# Patient Record
Sex: FEMALE | Race: WHITE | URBAN - METROPOLITAN AREA
[De-identification: names, ages, dates, MRNs, and addresses within clinical notes are randomized per-mention and may not be internally consistent; named-entity substitution may affect disease eponyms.]

---

## 2019-05-18 ENCOUNTER — OFFICE VISIT (OUTPATIENT)
Dept: URGENT CARE | Facility: URGENT CARE | Age: 69
End: 2019-05-18
Payer: MEDICARE

## 2019-05-18 VITALS
SYSTOLIC BLOOD PRESSURE: 137 MMHG | WEIGHT: 159 LBS | OXYGEN SATURATION: 99 % | HEART RATE: 101 BPM | TEMPERATURE: 97.9 F | DIASTOLIC BLOOD PRESSURE: 87 MMHG

## 2019-05-18 DIAGNOSIS — R30.0 DYSURIA: ICD-10-CM

## 2019-05-18 DIAGNOSIS — R82.90 NONSPECIFIC FINDING ON EXAMINATION OF URINE: ICD-10-CM

## 2019-05-18 DIAGNOSIS — L02.92 BOIL: ICD-10-CM

## 2019-05-18 DIAGNOSIS — N30.01 ACUTE CYSTITIS WITH HEMATURIA: Primary | ICD-10-CM

## 2019-05-18 LAB
ALBUMIN UR-MCNC: 100 MG/DL
APPEARANCE UR: CLEAR
BACTERIA #/AREA URNS HPF: ABNORMAL /HPF
BILIRUB UR QL STRIP: NEGATIVE
COLOR UR AUTO: YELLOW
GLUCOSE UR STRIP-MCNC: NEGATIVE MG/DL
HGB UR QL STRIP: ABNORMAL
KETONES UR STRIP-MCNC: NEGATIVE MG/DL
LEUKOCYTE ESTERASE UR QL STRIP: ABNORMAL
NITRATE UR QL: NEGATIVE
NON-SQ EPI CELLS #/AREA URNS LPF: ABNORMAL /LPF
PH UR STRIP: 7 PH (ref 5–7)
RBC #/AREA URNS AUTO: ABNORMAL /HPF
SOURCE: ABNORMAL
SP GR UR STRIP: 1.01 (ref 1–1.03)
UROBILINOGEN UR STRIP-ACNC: 0.2 EU/DL (ref 0.2–1)
WBC #/AREA URNS AUTO: ABNORMAL /HPF

## 2019-05-18 PROCEDURE — 81001 URINALYSIS AUTO W/SCOPE: CPT | Performed by: INTERNAL MEDICINE

## 2019-05-18 PROCEDURE — 87086 URINE CULTURE/COLONY COUNT: CPT | Performed by: PHYSICIAN ASSISTANT

## 2019-05-18 PROCEDURE — 99203 OFFICE O/P NEW LOW 30 MIN: CPT | Performed by: PHYSICIAN ASSISTANT

## 2019-05-18 RX ORDER — SULFAMETHOXAZOLE/TRIMETHOPRIM 800-160 MG
1 TABLET ORAL 2 TIMES DAILY
Qty: 14 TABLET | Refills: 0 | Status: SHIPPED | OUTPATIENT
Start: 2019-05-18 | End: 2019-05-25

## 2019-05-18 NOTE — PATIENT INSTRUCTIONS
1. UTI  Your urine test shows evidence of a urinary tract infection.    We will treat you with an antibiotic, Bactrim.  I sent this to your pharmacy. Please take as directed twice daily for 7 days. Please take a probiotic while you are on the antibiotic.    If developing high fevers, vomiting, abdominal pain, or any other new, concerning symptoms, come back immediately. If no improvement in symptoms by the end of your antibiotic treatment, follow up with your primary care doctor.    Otherwise, simply follow up as needed.        Urinary Tract Infections in Women  Urinary tract infections (UTIs) are most often caused by bacteria (germs). These bacteria enter the urinary tract. The bacteria may come from outside the body. Or they may travel from the skin outside the rectum or vagina into the urethra. Female anatomy makes it easier for bacteria from the bowel to enter a woman s urinary tract, which is the most common source of UTI. This means women develop UTIs more often than men. Pain in or around the urinary tract is a common UTI symptom. But the only way to know for sure if you have a UTI for the health care provider to test your urine. The two tests that may be done are the urinalysis and urine culture.  Types of UTIs    Cystitis: A bladder infection (cystitis) is the most common UTI in women. You may have urgent or frequent urination. You may also have pain, burning when you urinate, and bloody urine.    Urethritis: This is an inflamed urethra, which is the tube that carries urine from the bladder to outside the body. You may have lower stomach or back pain. You may also have urgent or frequent urination.    Pyelonephritis: This is a kidney infection. If not treated, it can be serious and damage your kidneys. In severe cases, you may be hospitalized. You may have a fever and lower back pain.  Medications to treat a UTI  Most UTIs are treated with antibiotics. These kill the bacteria. The length of time you need to  take them depends on the type of infection. It may be as short as 3 days. If you have repeated UTIs, a low-dose antibiotic may be needed for several months. Take antibiotics exactly as directed. Don t stop taking them until all of the medication is gone. If you stop taking the antibiotic too soon, the infection may not go away, and you may develop a resistance to the antibiotic. This can make it much harder to treat.  Lifestyle changes to treat and prevent UTIs  The lifestyle changes below will help get rid of your UTI. They may also help prevent future UTIs.    Drink plenty of fluids. This includes water, juice, or other caffeine-free drinks. Fluids help flush bacteria out of your body.    Empty your bladder. Always empty your bladder when you feel the urge to urinate. And always urinate before going to sleep. Urine that stays in your bladder can lead to infection. Try to urinate before and after sex as well.    Practice good personal hygiene. Wipe yourself from front to back after using the toilet. This helps keep bacteria from getting into the urethra.    Use condoms during sex. These help prevent UTIs caused by sexually transmitted bacteria. Also, avoid using spermicides during sex. These can increase the risk of UTIs. Choose other forms of birth control instead. For women who tend to get UTIs after sex, a low-dose of a preventive antibiotic may be used. Be sure to discuss this option with your health care provider.    Follow up with your health care provider as directed. He or she may test to make sure the infection has cleared. If necessary, additional treatment may be started.    8028-8320 [employer]. [address] [place] [address], [place] [address]. All rights reserved. This information is not intended as a substitute for professional medical care. Always follow your healthcare professional's instructions.      2. Boil on bottom  There are no signs of an abscess needing to be drained today.  This  does appear like a skin infection.  Please take Bactrim twice daily as above for treatment.  Follow up with your doctor for a recheck on Monday.  If spreading redness, fever, severe swelling, then be seen immediately.

## 2019-05-18 NOTE — PROGRESS NOTES
"SUBJECTIVE:   Nani Ladd is a 68 year old female presenting with a chief complaint of   Chief Complaint   Patient presents with     Urgent Care     UTI     c/o dysuria and sore on back     1.   UTI    Onset of symptoms was 2day(s).  Course of illness is worsening  Severity moderate  Current and associated symptoms dysuria  Urinary frequency, urgency  Noticed blood in urine today. Denies passing blood clots.  No nausea, vomiting, abdominal pain. Has bladder fullness sensation. No fever. No flank pain.  Treatment and measures tried Increase fluid intake  Predisposing factors include: frequent UTI  Is not diabetic. No history of kidney problems.      2. Sore on right buttock  Sat on a piece of glass on April 11. She went to urgent care and got 8 stitches for this. She had the stitches removed. She said it seemed to be healing well. Then, a few days ago, she noticed some \"staining\" of blood and serous fluid on her panties in this area. It is starting to seem inflamed now, getting worse. She did go on a bike ride. No fever.       Patient is from out of town visiting for a graduation.      ROS  See HPI    histo  PMH:  Past Medical History:   Diagnosis Date     History of breast cancer      There is no problem list on file for this patient.        Current Medications:  Current Outpatient Medications   Medication Sig Dispense Refill     sulfamethoxazole-trimethoprim (BACTRIM DS) 800-160 MG tablet Take 1 tablet by mouth 2 times daily for 7 days 14 tablet 0       Surgical History:  No past surgical history on file.    Family History:  No family history on file.    Social History:  Social History     Tobacco Use     Smoking status: Never Smoker     Smokeless tobacco: Never Used   Substance Use Topics     Alcohol use: Not on file       OBJECTIVE  /87   Pulse 101   Temp 97.9  F (36.6  C) (Temporal)   Wt 72.1 kg (159 lb)   SpO2 99%     General: alert, appears comfortable, NAD. Afebrile.  Skin: there is a " well-healed scar at the bottom of the right buttock where meets the thigh. Above the scar there is a pustule with about 1cm ring of surrounding erythema, mild induration. No fluctuance. No drainage.  Respiratory: No distress. Lungs CTAB.  Cardiovascular:RRR. No murmurs, clicks gallops or rub.   Gastrointestinal: Abdomen soft, nontender. BS normal. No masses, organomegaly.   : no CVA tenderness nor suprapubic tenderness.  Neurologic: Follows commands. Gait normal.   Psychiatric: mentation appears normal and affect normal/bright         Labs:  Results for orders placed or performed in visit on 05/18/19 (from the past 24 hour(s))   *UA reflex to Microscopic and Culture ([facility] (except [place] and [place])   Result Value Ref Range    Color Urine Yellow     Appearance Urine Clear     Glucose Urine Negative NEG^Negative mg/dL    Bilirubin Urine Negative NEG^Negative    Ketones Urine Negative NEG^Negative mg/dL    Specific Gravity Urine 1.010 1.003 - 1.035    Blood Urine Large (A) NEG^Negative    pH Urine 7.0 5.0 - 7.0 pH    Protein Albumin Urine 100 (A) NEG^Negative mg/dL    Urobilinogen Urine 0.2 0.2 - 1.0 EU/dL    Nitrite Urine Negative NEG^Negative    Leukocyte Esterase Urine Large (A) NEG^Negative    Source Midstream Urine    Urine Microscopic   Result Value Ref Range    WBC Urine  (A) OTO5^0 - 5 /HPF    RBC Urine 25-50 (A) OTO2^O - 2 /HPF    Squamous Epithelial /LPF Urine Few FEW^Few /LPF    Bacteria Urine Moderate (A) NEG^Negative /HPF             ASSESSMENT/PLAN:    ICD-10-CM    1. Acute cystitis with hematuria N30.01 sulfamethoxazole-trimethoprim (BACTRIM DS) 800-160 MG tablet   2. Boil L02.92 sulfamethoxazole-trimethoprim (BACTRIM DS) 800-160 MG tablet   3. Nonspecific finding on examination of urine R82.90 Urine Culture Aerobic Bacterial   4. Dysuria R30.0 *UA reflex to Microscopic and Culture ([facility] (except [place] and [place])     Urine Microscopic            Medical Decision Making:    Serious Comorbid Conditions: none    1. UTI  UA highly positive. Symptoms and findings are suggestive of uncomplicated UTI.   No signs of urosepsis, pyelonephritis, kidney stone, pelvic infection, or other more serious etiology of symptoms.    We will treat with Bactrim today. Urine culture is pending; we will follow up on results with patient if change in plan is needed.      2. Boil on buttocks  Does not appear to have a wound infection- the pustule seems separate from the well-healed scar from previous injury >1 month ago.  There is no sign of an abscess that needs drainage today- not fluctuant.  Recommend treatment with antibiotic today and close follow up with PCP for recheck when she gets home in 2 days.  Bactrim prescribed.    At the end of the encounter, I discussed all available results, as well as the diagnosis and any associated medications. I discussed red flags for immediate return to clinic/ER, as well as indications for follow up. Refer to patient instructions below, which were all addressed with patient. Patient understood and agreed to plan. Patient was appropriate for discharge.      Patient Instructions   1. UTI  Your urine test shows evidence of a urinary tract infection.    We will treat you with an antibiotic, Bactrim.  I sent this to your pharmacy. Please take as directed twice daily for 7 days. Please take a probiotic while you are on the antibiotic.    If developing high fevers, vomiting, abdominal pain, or any other new, concerning symptoms, come back immediately. If no improvement in symptoms by the end of your antibiotic treatment, follow up with your primary care doctor.    Otherwise, simply follow up as needed.        Urinary Tract Infections in Women  Urinary tract infections (UTIs) are most often caused by bacteria (germs). These bacteria enter the urinary tract. The bacteria may come from outside the body. Or they may travel from the skin outside  the rectum or vagina into the urethra. Female anatomy makes it easier for bacteria from the bowel to enter a woman s urinary tract, which is the most common source of UTI. This means women develop UTIs more often than men. Pain in or around the urinary tract is a common UTI symptom. But the only way to know for sure if you have a UTI for the health care provider to test your urine. The two tests that may be done are the urinalysis and urine culture.  Types of UTIs    Cystitis: A bladder infection (cystitis) is the most common UTI in women. You may have urgent or frequent urination. You may also have pain, burning when you urinate, and bloody urine.    Urethritis: This is an inflamed urethra, which is the tube that carries urine from the bladder to outside the body. You may have lower stomach or back pain. You may also have urgent or frequent urination.    Pyelonephritis: This is a kidney infection. If not treated, it can be serious and damage your kidneys. In severe cases, you may be hospitalized. You may have a fever and lower back pain.  Medications to treat a UTI  Most UTIs are treated with antibiotics. These kill the bacteria. The length of time you need to take them depends on the type of infection. It may be as short as 3 days. If you have repeated UTIs, a low-dose antibiotic may be needed for several months. Take antibiotics exactly as directed. Don t stop taking them until all of the medication is gone. If you stop taking the antibiotic too soon, the infection may not go away, and you may develop a resistance to the antibiotic. This can make it much harder to treat.  Lifestyle changes to treat and prevent UTIs  The lifestyle changes below will help get rid of your UTI. They may also help prevent future UTIs.    Drink plenty of fluids. This includes water, juice, or other caffeine-free drinks. Fluids help flush bacteria out of your body.    Empty your bladder. Always empty your bladder when you feel the urge  to urinate. And always urinate before going to sleep. Urine that stays in your bladder can lead to infection. Try to urinate before and after sex as well.    Practice good personal hygiene. Wipe yourself from front to back after using the toilet. This helps keep bacteria from getting into the urethra.    Use condoms during sex. These help prevent UTIs caused by sexually transmitted bacteria. Also, avoid using spermicides during sex. These can increase the risk of UTIs. Choose other forms of birth control instead. For women who tend to get UTIs after sex, a low-dose of a preventive antibiotic may be used. Be sure to discuss this option with your health care provider.    Follow up with your health care provider as directed. He or she may test to make sure the infection has cleared. If necessary, additional treatment may be started.    6515-4925 [employer]. [address] [place] [address]. All rights reserved. This information is not intended as a substitute for professional medical care. Always follow your healthcare professional's instructions.      2. Boil on bottom  There are no signs of an abscess needing to be drained today.  This does appear like a skin infection.  Please take Bactrim twice daily as above for treatment.  Follow up with your doctor for a recheck on Monday.  If spreading redness, fever, severe swelling, then be seen immediately.            Millie Becerra PA-C

## 2019-05-19 LAB
BACTERIA SPEC CULT: NORMAL
SPECIMEN SOURCE: NORMAL